# Patient Record
Sex: FEMALE | Race: WHITE | NOT HISPANIC OR LATINO | Employment: FULL TIME | URBAN - METROPOLITAN AREA
[De-identification: names, ages, dates, MRNs, and addresses within clinical notes are randomized per-mention and may not be internally consistent; named-entity substitution may affect disease eponyms.]

---

## 2021-03-09 ENCOUNTER — IMMUNIZATION (OUTPATIENT)
Dept: NURSING | Facility: CLINIC | Age: 31
End: 2021-03-09
Payer: COMMERCIAL

## 2021-03-09 PROCEDURE — 0031A PR COVID VAC JANSSEN AD26 0.5ML: CPT

## 2021-03-09 PROCEDURE — 91303 PR COVID VAC JANSSEN AD26 0.5ML: CPT

## 2021-04-03 ENCOUNTER — HEALTH MAINTENANCE LETTER (OUTPATIENT)
Age: 31
End: 2021-04-03

## 2021-05-26 ENCOUNTER — RECORDS - HEALTHEAST (OUTPATIENT)
Dept: ADMINISTRATIVE | Facility: CLINIC | Age: 31
End: 2021-05-26

## 2021-05-28 ENCOUNTER — RECORDS - HEALTHEAST (OUTPATIENT)
Dept: ADMINISTRATIVE | Facility: CLINIC | Age: 31
End: 2021-05-28

## 2021-06-02 ENCOUNTER — RECORDS - HEALTHEAST (OUTPATIENT)
Dept: ADMINISTRATIVE | Facility: CLINIC | Age: 31
End: 2021-06-02

## 2021-09-12 ENCOUNTER — HEALTH MAINTENANCE LETTER (OUTPATIENT)
Age: 31
End: 2021-09-12

## 2022-04-24 ENCOUNTER — HEALTH MAINTENANCE LETTER (OUTPATIENT)
Age: 32
End: 2022-04-24

## 2022-11-19 ENCOUNTER — HEALTH MAINTENANCE LETTER (OUTPATIENT)
Age: 32
End: 2022-11-19

## 2023-02-08 ENCOUNTER — VIRTUAL VISIT (OUTPATIENT)
Dept: FAMILY MEDICINE | Facility: CLINIC | Age: 33
End: 2023-02-08
Payer: COMMERCIAL

## 2023-02-08 DIAGNOSIS — I10 PRIMARY HYPERTENSION: Primary | ICD-10-CM

## 2023-02-08 DIAGNOSIS — F90.0 ATTENTION DEFICIT HYPERACTIVITY DISORDER (ADHD), PREDOMINANTLY INATTENTIVE TYPE: ICD-10-CM

## 2023-02-08 DIAGNOSIS — K21.9 GASTROESOPHAGEAL REFLUX DISEASE WITHOUT ESOPHAGITIS: ICD-10-CM

## 2023-02-08 DIAGNOSIS — E28.2 PCOS (POLYCYSTIC OVARIAN SYNDROME): ICD-10-CM

## 2023-02-08 DIAGNOSIS — F41.0 PANIC ATTACK: ICD-10-CM

## 2023-02-08 PROBLEM — F41.8 SITUATIONAL ANXIETY: Status: ACTIVE | Noted: 2019-09-26

## 2023-02-08 PROCEDURE — 99204 OFFICE O/P NEW MOD 45 MIN: CPT | Mod: TEL

## 2023-02-08 RX ORDER — PROPRANOLOL HYDROCHLORIDE 10 MG/1
10 TABLET ORAL PRN
COMMUNITY
Start: 2022-03-25 | End: 2023-02-08

## 2023-02-08 RX ORDER — DEXTROAMPHETAMINE SACCHARATE, AMPHETAMINE ASPARTATE, DEXTROAMPHETAMINE SULFATE AND AMPHETAMINE SULFATE 2.5; 2.5; 2.5; 2.5 MG/1; MG/1; MG/1; MG/1
10 TABLET ORAL 2 TIMES DAILY
COMMUNITY
Start: 2021-06-19

## 2023-02-08 RX ORDER — LISINOPRIL 20 MG/1
20 TABLET ORAL DAILY
Qty: 90 TABLET | Refills: 1 | Status: SHIPPED | OUTPATIENT
Start: 2023-02-08 | End: 2023-08-09

## 2023-02-08 RX ORDER — ACETAMINOPHEN AND CODEINE PHOSPHATE 120; 12 MG/5ML; MG/5ML
1 SOLUTION ORAL DAILY
COMMUNITY
Start: 2022-04-28 | End: 2023-11-24

## 2023-02-08 RX ORDER — PROPRANOLOL HYDROCHLORIDE 10 MG/1
10 TABLET ORAL PRN
Qty: 10 TABLET | Refills: 1 | Status: SHIPPED | OUTPATIENT
Start: 2023-02-08 | End: 2023-12-06

## 2023-02-08 RX ORDER — LISINOPRIL 20 MG/1
20 TABLET ORAL DAILY
COMMUNITY
Start: 2023-01-25 | End: 2023-02-08

## 2023-02-08 NOTE — PROGRESS NOTES
Stephanie is a 32 year old who is being evaluated via a billable telephone visit.      What phone number would you like to be contacted at? [phone]  How would you like to obtain your AVS? Yennifer  Distant Location (provider location):  On-site    Assessment & Plan     Primary hypertension  Chronic. Well controlled per patient report. Filled for 6 months, would like in person follow up prior to next refills so we can get updated VS in clinic and labs.   - lisinopril (ZESTRIL) 20 MG tablet  Dispense: 90 tablet; Refill: 1    Panic attack  Chronic/occasional. Rarely needs prn med. Renewed Rx with 1 refill. Will refill if needed prior to next in person appt withing 6 months. Discussed that adderall can have side effect of palpitations/tachycardia, especially if dehydrated and advised adequate fluid intake.   - propranolol (INDERAL) 10 MG tablet  Dispense: 10 tablet; Refill: 1    PCOS (polycystic ovarian syndrome)  Chronic. Takes metformin and POP. Has OBGyn provider, if not in her insurance network I am happy to refill for her.     Gastroesophageal reflux disease without esophagitis  Chronic. Poorly controlled per patient report. No prior bleeding or EGD. We discussed trying to increase her PPI to 40 mg prior to breakfast OR taking 20 mg in AM and again before dinner. Sent Rx for 6 months. In person follow up prior to further refills.   - omeprazole (PRILOSEC) 20 MG DR capsule  Dispense: 90 capsule; Refill: 1    Attention deficit hyperactivity disorder (ADHD), predominantly inattentive type  Chronic. Takes adderall 10 mg BID. Rx filled by mental health provider.       Ordering of each unique test  Prescription drug management         Nicotine/Tobacco Cessation:  She reports that she has been smoking cigarettes. She has never used smokeless tobacco.  Nicotine/Tobacco Cessation Plan:   Not discussed today      Follow up in person within 6 months - discuss GERD, BP control, anxiety/panic attacks and palpitations. Sooner  if worsening.     No follow-ups on file.    FIDELIA Gong CNP  [facility]    Hugh Brown is a 32 year old, presenting for the following health issues:  Hypertension      HPI     Changed insurance, needs new PCP, last seen by PCP in June 2022.    Currently prescriptions listed below from last pcp, sees gynecologist and psychiatrist.   lisinoril 20 mg bp, needs refills, run out tomorrow.   Omeprazole for gerd  Metformin 500 mg for pcos    Takes progesterone only birth control, prescribed by gynecologist.      Adderall 10 mg twice daily, prescribed by psychiatrist.     Hypertension Follow-up (lisinopril)    Do you check your blood pressure regularly outside of the clinic? Yes     Are you following a low salt diet? Yes    Are your blood pressures ever more than 140 on the top number (systolic) OR more   than 90 on the bottom number (diastolic), for example 140/90? No   When pt checks Bp at home, readings range between 120s-127/80-90s.   No identified secondary cause of HTN in the past, started with increased stress level in the past.     PCOS (metformin, norethindone POP)  Managed by her gynecologist, she has not checked if her health insurance will cover her OBGYN, might need these meds refilled in the future too.     Anxiety/Panic attacks (propranolol)  Managed by mental health provider.   - propranolol if needed, usually takes 1/2 to 1/4th of a tablet if she is noticing palpitations/anxiety.   - notices palpitations especially when laying down, when tired    GERD (omeprazole)  Takes PPI daily with breakfast, 20 mg, has not tried higher dose  Feels her GERD is not well controlled  Still has indigestion with PPI  Notices feeling sensation as if something stuck in throat                Review of Systems   Constitutional, HEENT, cardiovascular, pulmonary, gi and gu systems are negative, except as otherwise noted.      Objective           Vitals:  No vitals were obtained today due  to virtual visit.    Physical Exam   healthy, alert and no distress  PSYCH: Alert and oriented times 3; coherent speech, normal   rate and volume, able to articulate logical thoughts, able   to abstract reason, no tangential thoughts, no hallucinations   or delusions  Her affect is normal  RESP: No cough, no audible wheezing, able to talk in full sentences  Remainder of exam unable to be completed due to telephone visits    No results found for any previous visit.               Phone call duration: 20 minutes

## 2023-04-21 ENCOUNTER — OFFICE VISIT (OUTPATIENT)
Dept: INTERNAL MEDICINE | Facility: CLINIC | Age: 33
End: 2023-04-21
Payer: COMMERCIAL

## 2023-04-21 ENCOUNTER — TELEPHONE (OUTPATIENT)
Dept: INTERNAL MEDICINE | Facility: CLINIC | Age: 33
End: 2023-04-21

## 2023-04-21 VITALS
HEART RATE: 90 BPM | OXYGEN SATURATION: 99 % | WEIGHT: 155.5 LBS | HEIGHT: 64 IN | DIASTOLIC BLOOD PRESSURE: 88 MMHG | RESPIRATION RATE: 14 BRPM | BODY MASS INDEX: 26.55 KG/M2 | SYSTOLIC BLOOD PRESSURE: 144 MMHG | TEMPERATURE: 99.3 F

## 2023-04-21 DIAGNOSIS — Z12.4 CERVICAL CANCER SCREENING: ICD-10-CM

## 2023-04-21 DIAGNOSIS — R79.89 ELEVATED LIVER FUNCTION TESTS: ICD-10-CM

## 2023-04-21 DIAGNOSIS — F41.1 GAD (GENERALIZED ANXIETY DISORDER): Primary | ICD-10-CM

## 2023-04-21 DIAGNOSIS — F90.0 ATTENTION DEFICIT HYPERACTIVITY DISORDER (ADHD), PREDOMINANTLY INATTENTIVE TYPE: ICD-10-CM

## 2023-04-21 DIAGNOSIS — I10 HYPERTENSION, UNSPECIFIED TYPE: ICD-10-CM

## 2023-04-21 LAB
ALBUMIN SERPL BCG-MCNC: 4.3 G/DL (ref 3.5–5.2)
ALBUMIN UR-MCNC: NEGATIVE MG/DL
ALP SERPL-CCNC: 56 U/L (ref 35–104)
ALT SERPL W P-5'-P-CCNC: 27 U/L (ref 10–35)
ANION GAP SERPL CALCULATED.3IONS-SCNC: 13 MMOL/L (ref 7–15)
APPEARANCE UR: CLEAR
AST SERPL W P-5'-P-CCNC: 26 U/L (ref 10–35)
BILIRUB SERPL-MCNC: 0.4 MG/DL
BILIRUB UR QL STRIP: NEGATIVE
BUN SERPL-MCNC: 6.8 MG/DL (ref 6–20)
CALCIUM SERPL-MCNC: 9.4 MG/DL (ref 8.6–10)
CHLORIDE SERPL-SCNC: 106 MMOL/L (ref 98–107)
CHOLEST SERPL-MCNC: 247 MG/DL
COLOR UR AUTO: YELLOW
CREAT SERPL-MCNC: 0.65 MG/DL (ref 0.51–0.95)
CREAT UR-MCNC: 213 MG/DL
DEPRECATED HCO3 PLAS-SCNC: 22 MMOL/L (ref 22–29)
ERYTHROCYTE [DISTWIDTH] IN BLOOD BY AUTOMATED COUNT: 13.8 % (ref 10–15)
GFR SERPL CREATININE-BSD FRML MDRD: >90 ML/MIN/1.73M2
GLUCOSE SERPL-MCNC: 107 MG/DL (ref 70–99)
GLUCOSE UR STRIP-MCNC: NEGATIVE MG/DL
HBA1C MFR BLD: 5.4 % (ref 0–5.6)
HCT VFR BLD AUTO: 43.5 % (ref 35–47)
HDLC SERPL-MCNC: 88 MG/DL
HGB BLD-MCNC: 14.2 G/DL (ref 11.7–15.7)
HGB UR QL STRIP: NEGATIVE
KETONES UR STRIP-MCNC: NEGATIVE MG/DL
LDLC SERPL CALC-MCNC: 135 MG/DL
LEUKOCYTE ESTERASE UR QL STRIP: NEGATIVE
MCH RBC QN AUTO: 28.2 PG (ref 26.5–33)
MCHC RBC AUTO-ENTMCNC: 32.6 G/DL (ref 31.5–36.5)
MCV RBC AUTO: 86 FL (ref 78–100)
MICROALBUMIN UR-MCNC: <12 MG/L
MICROALBUMIN/CREAT UR: NORMAL MG/G{CREAT}
NITRATE UR QL: NEGATIVE
NONHDLC SERPL-MCNC: 159 MG/DL
PH UR STRIP: 6 [PH] (ref 5–8)
PLATELET # BLD AUTO: 261 10E3/UL (ref 150–450)
POTASSIUM SERPL-SCNC: 3.8 MMOL/L (ref 3.4–5.3)
PROT SERPL-MCNC: 6.8 G/DL (ref 6.4–8.3)
RBC # BLD AUTO: 5.04 10E6/UL (ref 3.8–5.2)
SODIUM SERPL-SCNC: 141 MMOL/L (ref 136–145)
SP GR UR STRIP: >=1.03 (ref 1–1.03)
TRIGL SERPL-MCNC: 119 MG/DL
TSH SERPL DL<=0.005 MIU/L-ACNC: 0.57 UIU/ML (ref 0.3–4.2)
UROBILINOGEN UR STRIP-ACNC: 0.2 E.U./DL
WBC # BLD AUTO: 5.6 10E3/UL (ref 4–11)

## 2023-04-21 PROCEDURE — 84443 ASSAY THYROID STIM HORMONE: CPT | Performed by: INTERNAL MEDICINE

## 2023-04-21 PROCEDURE — 36415 COLL VENOUS BLD VENIPUNCTURE: CPT | Performed by: INTERNAL MEDICINE

## 2023-04-21 PROCEDURE — 87340 HEPATITIS B SURFACE AG IA: CPT | Performed by: INTERNAL MEDICINE

## 2023-04-21 PROCEDURE — 83036 HEMOGLOBIN GLYCOSYLATED A1C: CPT | Performed by: INTERNAL MEDICINE

## 2023-04-21 PROCEDURE — 85027 COMPLETE CBC AUTOMATED: CPT | Performed by: INTERNAL MEDICINE

## 2023-04-21 PROCEDURE — 80061 LIPID PANEL: CPT | Performed by: INTERNAL MEDICINE

## 2023-04-21 PROCEDURE — 80053 COMPREHEN METABOLIC PANEL: CPT | Performed by: INTERNAL MEDICINE

## 2023-04-21 PROCEDURE — 86803 HEPATITIS C AB TEST: CPT | Performed by: INTERNAL MEDICINE

## 2023-04-21 PROCEDURE — 81003 URINALYSIS AUTO W/O SCOPE: CPT | Performed by: INTERNAL MEDICINE

## 2023-04-21 PROCEDURE — 82570 ASSAY OF URINE CREATININE: CPT | Performed by: INTERNAL MEDICINE

## 2023-04-21 PROCEDURE — 96127 BRIEF EMOTIONAL/BEHAV ASSMT: CPT | Performed by: INTERNAL MEDICINE

## 2023-04-21 PROCEDURE — 82043 UR ALBUMIN QUANTITATIVE: CPT | Performed by: INTERNAL MEDICINE

## 2023-04-21 PROCEDURE — 99214 OFFICE O/P EST MOD 30 MIN: CPT | Performed by: INTERNAL MEDICINE

## 2023-04-21 RX ORDER — BUPROPION HYDROCHLORIDE 150 MG/1
150 TABLET ORAL EVERY MORNING
Qty: 90 TABLET | Refills: 3 | Status: SHIPPED | OUTPATIENT
Start: 2023-04-21 | End: 2023-11-24 | Stop reason: DRUGHIGH

## 2023-04-21 RX ORDER — LISINOPRIL 10 MG/1
1 TABLET ORAL
COMMUNITY
Start: 2022-04-27 | End: 2023-04-21

## 2023-04-21 ASSESSMENT — ANXIETY QUESTIONNAIRES
1. FEELING NERVOUS, ANXIOUS, OR ON EDGE: MORE THAN HALF THE DAYS
7. FEELING AFRAID AS IF SOMETHING AWFUL MIGHT HAPPEN: NEARLY EVERY DAY
GAD7 TOTAL SCORE: 12
GAD7 TOTAL SCORE: 12
3. WORRYING TOO MUCH ABOUT DIFFERENT THINGS: SEVERAL DAYS
IF YOU CHECKED OFF ANY PROBLEMS ON THIS QUESTIONNAIRE, HOW DIFFICULT HAVE THESE PROBLEMS MADE IT FOR YOU TO DO YOUR WORK, TAKE CARE OF THINGS AT HOME, OR GET ALONG WITH OTHER PEOPLE: SOMEWHAT DIFFICULT
2. NOT BEING ABLE TO STOP OR CONTROL WORRYING: SEVERAL DAYS
4. TROUBLE RELAXING: MORE THAN HALF THE DAYS
7. FEELING AFRAID AS IF SOMETHING AWFUL MIGHT HAPPEN: NEARLY EVERY DAY
GAD7 TOTAL SCORE: 12
6. BECOMING EASILY ANNOYED OR IRRITABLE: SEVERAL DAYS
8. IF YOU CHECKED OFF ANY PROBLEMS, HOW DIFFICULT HAVE THESE MADE IT FOR YOU TO DO YOUR WORK, TAKE CARE OF THINGS AT HOME, OR GET ALONG WITH OTHER PEOPLE?: SOMEWHAT DIFFICULT
5. BEING SO RESTLESS THAT IT IS HARD TO SIT STILL: MORE THAN HALF THE DAYS

## 2023-04-21 ASSESSMENT — ENCOUNTER SYMPTOMS: NERVOUS/ANXIOUS: 1

## 2023-04-21 ASSESSMENT — PAIN SCALES - GENERAL: PAINLEVEL: MILD PAIN (2)

## 2023-04-21 NOTE — PROGRESS NOTES
"  Assessment & Plan     Cervical cancer screening  She says that she has had it at another facility.    MASTER (generalized anxiety disorder)  She has been on antidepressants in the past and propanolol.  She also admits the Adderall might be perpetuating the anxiety and so is the nicotine nurse smoking.    Hypertension, unspecified type  Was started by her primary care physician I do not see any secondary hypertension work-up.  We will for now continue the medication strongly advised nicotine cessation.  Switch to Wellbutrin for anxiety and the attention deficit and taper off Adderall.  We will do labs today and close follow-up.  - Comprehensive metabolic panel  - TSH  - CBC with platelets  - buPROPion (WELLBUTRIN XL) 150 MG 24 hr tablet  Dispense: 90 tablet; Refill: 3  - nicotine (NICOTROL) 10 MG inhaler  Dispense: 90 each; Refill: 3  - Lipid panel reflex to direct LDL Fasting  - Hemoglobin A1c  - UA Macro with Reflex to Micro and Culture - lab collect  - Albumin Random Urine Quantitative with Creat Ratio  - Comprehensive metabolic panel  - TSH  - CBC with platelets  - Lipid panel reflex to direct LDL Fasting  - Hemoglobin A1c  - UA Macro with Reflex to Micro and Culture - lab collect  - Albumin Random Urine Quantitative with Creat Ratio    Attention deficit hyperactivity disorder (ADHD), predominantly inattentive type      Elevated liver function tests    - Hepatitis C antibody  - Hepatitis B surface antigen  - Hepatitis C antibody  - Hepatitis B surface antigen               BMI:   Estimated body mass index is 26.91 kg/m  as calculated from the following:    Height as of this encounter: 1.619 m (5' 3.74\").    Weight as of this encounter: 70.5 kg (155 lb 8 oz).           Marleny Leal MD  [facility]   Stephanie is a 32 year old, presenting for the following health issues: Very pleasant patient  Recheck Medication, Anxiety (Anxiety and panic attacks. Pt reports HTN has been " "triggering panic attacks. Pt reports on Tuesday she had chest tightness that resolved 3 hours later.  Wednesday she states she felt like she was going to pass out. Pt reports having a EKG done last year. ), Derm Problem (Pt states she has a \"skin tag or wart\" on inner upper thigh (r) that she would like examined ), and Hypertension (Concerns with bp)  high bp when she went for her physical and started on lisinopril this happened last year also diagnosed to have ADTwo years ago   Grad school D started adderral 10mg twice a day she noticed her higher blood pressure did try to lower 5mg twice adya     Both parents have high blood pressure diagnoses , early IHD , 55   cigareete smoking   Has tried nicotine patch   Alcohol , 3-4 drinks hard liquor she drinks this every day      4/21/2023     9:33 AM   Additional Questions   Roomed by Miranda Mckeon    History of Present Illness       Mental Health Follow-up:  Patient presents to follow-up on Anxiety.    Patient's anxiety since last visit has been:  Medium  The patient is having other symptoms associated with anxiety.  Any significant life events: health concerns  Patient is feeling anxious or having panic attacks.  Patient has no concerns about alcohol or drug use.    Hypertension: She presents for follow up of hypertension.  She does check blood pressure  regularly outside of the clinic. Outpatient blood pressures have not been over 140/90. She does not follow a low salt diet.     She eats 2-3 servings of fruits and vegetables daily.She consumes 1 sweetened beverage(s) daily.She exercises with enough effort to increase her heart rate 60 or more minutes per day.  She exercises with enough effort to increase her heart rate 5 days per week.   She is taking medications regularly.  Today's MASTER-7 Score: 12       Patient Active Problem List   Diagnosis     Panic attack     Situational anxiety     PCOS (polycystic ovarian syndrome)     GERD (gastroesophageal reflux " "disease)     Attention deficit hyperactivity disorder (ADHD), predominantly inattentive type             Review of Systems   Psychiatric/Behavioral: The patient is nervous/anxious.             Objective    BP (!) 144/88 (BP Location: Left arm, Patient Position: Sitting, Cuff Size: Adult Regular)   Pulse 90   Temp 99.3  F (37.4  C) (Tympanic)   Resp 14   Ht 1.619 m (5' 3.74\")   Wt 70.5 kg (155 lb 8 oz)   LMP 05/01/2022 (Approximate)   SpO2 99%   BMI 26.91 kg/m    Body mass index is 26.91 kg/m .  Physical Exam   GENERAL: healthy, alert and no distress  NECK: no adenopathy, no asymmetry, masses, or scars and thyroid normal to palpation  RESP: lungs clear to auscultation - no rales, rhonchi or wheezes  CV: regular rate and rhythm, normal S1 S2, no S3 or S4, no murmur, click or rub, no peripheral edema and peripheral pulses strong  ABDOMEN: soft, nontender, no hepatosplenomegaly, no masses and bowel sounds normal  MS: no gross musculoskeletal defects noted, no edema                    "

## 2023-04-21 NOTE — TELEPHONE ENCOUNTER
PRIOR AUTHORIZATION DENIED    Medication: nicotine (NICOTROL) 10 MG inhaler - EPA DENIED    Denial Date: 4/21/2023    Denial Rational:       Appeal Information:

## 2023-04-21 NOTE — PATIENT INSTRUCTIONS
[employer] is for any [facility] looking for free support to quit smoking, vaping or chewing.   [employer] will offer many quit support options and resources so [place] residents can continue to find the way to quit that works best for them.   Free support includes personalized coaching, email and text support, educational materials, and quit medication (nicotine patches, gum or lozenges) delivered by mail.     Contact [employer] at 1-800-QUIT-NOW or online at [employer] to receive support on your quit journey.

## 2023-04-24 LAB
HBV SURFACE AG SERPL QL IA: NONREACTIVE
HCV AB SERPL QL IA: NONREACTIVE

## 2023-05-05 ENCOUNTER — OFFICE VISIT (OUTPATIENT)
Dept: INTERNAL MEDICINE | Facility: CLINIC | Age: 33
End: 2023-05-05
Payer: COMMERCIAL

## 2023-05-05 VITALS
HEART RATE: 88 BPM | DIASTOLIC BLOOD PRESSURE: 88 MMHG | SYSTOLIC BLOOD PRESSURE: 132 MMHG | TEMPERATURE: 98.8 F | RESPIRATION RATE: 16 BRPM | HEIGHT: 63 IN | BODY MASS INDEX: 27.05 KG/M2 | OXYGEN SATURATION: 99 % | WEIGHT: 152.7 LBS

## 2023-05-05 DIAGNOSIS — F90.0 ATTENTION DEFICIT HYPERACTIVITY DISORDER (ADHD), PREDOMINANTLY INATTENTIVE TYPE: Primary | ICD-10-CM

## 2023-05-05 DIAGNOSIS — F41.1 GAD (GENERALIZED ANXIETY DISORDER): ICD-10-CM

## 2023-05-05 DIAGNOSIS — F17.201 NICOTINE DEPENDENCE IN REMISSION, UNSPECIFIED NICOTINE PRODUCT TYPE: ICD-10-CM

## 2023-05-05 DIAGNOSIS — B08.1 MOLLUSCUM CONTAGIOSUM INFECTION: ICD-10-CM

## 2023-05-05 DIAGNOSIS — I10 ESSENTIAL HYPERTENSION: ICD-10-CM

## 2023-05-05 PROCEDURE — 99213 OFFICE O/P EST LOW 20 MIN: CPT | Performed by: INTERNAL MEDICINE

## 2023-05-05 RX ORDER — MUPIROCIN 20 MG/G
OINTMENT TOPICAL 3 TIMES DAILY
Qty: 22 G | Refills: 1 | Status: SHIPPED | OUTPATIENT
Start: 2023-05-05 | End: 2023-11-24

## 2023-05-05 ASSESSMENT — ANXIETY QUESTIONNAIRES
7. FEELING AFRAID AS IF SOMETHING AWFUL MIGHT HAPPEN: SEVERAL DAYS
4. TROUBLE RELAXING: NOT AT ALL
GAD7 TOTAL SCORE: 4
6. BECOMING EASILY ANNOYED OR IRRITABLE: SEVERAL DAYS
5. BEING SO RESTLESS THAT IT IS HARD TO SIT STILL: NOT AT ALL
8. IF YOU CHECKED OFF ANY PROBLEMS, HOW DIFFICULT HAVE THESE MADE IT FOR YOU TO DO YOUR WORK, TAKE CARE OF THINGS AT HOME, OR GET ALONG WITH OTHER PEOPLE?: SOMEWHAT DIFFICULT
IF YOU CHECKED OFF ANY PROBLEMS ON THIS QUESTIONNAIRE, HOW DIFFICULT HAVE THESE PROBLEMS MADE IT FOR YOU TO DO YOUR WORK, TAKE CARE OF THINGS AT HOME, OR GET ALONG WITH OTHER PEOPLE: SOMEWHAT DIFFICULT
2. NOT BEING ABLE TO STOP OR CONTROL WORRYING: NOT AT ALL
GAD7 TOTAL SCORE: 4
GAD7 TOTAL SCORE: 4
3. WORRYING TOO MUCH ABOUT DIFFERENT THINGS: SEVERAL DAYS
7. FEELING AFRAID AS IF SOMETHING AWFUL MIGHT HAPPEN: SEVERAL DAYS
1. FEELING NERVOUS, ANXIOUS, OR ON EDGE: SEVERAL DAYS

## 2023-05-19 NOTE — TELEPHONE ENCOUNTER
She is currently on bupriopin and has been for many years and the nicotol is for tobacco cessation so meets their criteria . Should I write a letter or write it in the office note ?

## 2023-05-23 NOTE — TELEPHONE ENCOUNTER
Prior Authorization Approval    Medication:    Authorization Effective Date: 5/20/2023  Authorization Expiration Date: 5/20/2024  Approved Dose/Quantity:  Reference #:     Insurance Company: "[employer] ([facility]) - Phone [phone] Fax [phone]  Expected CoPay:       CoPay Card Available:      Financial Assistance Needed:   Which Pharmacy is filling the prescription: [facility] [place] [place] [id number] - [place], [place] - [address] [place] [address] [address]  Pharmacy Notified: Yes  Patient Notified: Yes

## 2023-06-01 ENCOUNTER — HEALTH MAINTENANCE LETTER (OUTPATIENT)
Age: 33
End: 2023-06-01

## 2023-06-03 ENCOUNTER — MYC MEDICAL ADVICE (OUTPATIENT)
Dept: INTERNAL MEDICINE | Facility: CLINIC | Age: 33
End: 2023-06-03
Payer: COMMERCIAL

## 2023-06-03 DIAGNOSIS — F41.0 PANIC ATTACK: Primary | ICD-10-CM

## 2023-06-12 RX ORDER — BUPROPION HYDROCHLORIDE 300 MG/1
300 TABLET ORAL EVERY MORNING
Qty: 90 TABLET | Refills: 3 | Status: SHIPPED | OUTPATIENT
Start: 2023-06-12

## 2023-08-09 DIAGNOSIS — I10 PRIMARY HYPERTENSION: ICD-10-CM

## 2023-08-09 DIAGNOSIS — K21.9 GASTROESOPHAGEAL REFLUX DISEASE WITHOUT ESOPHAGITIS: ICD-10-CM

## 2023-08-09 RX ORDER — LISINOPRIL 20 MG/1
20 TABLET ORAL DAILY
Qty: 30 TABLET | Refills: 11 | Status: SHIPPED | OUTPATIENT
Start: 2023-08-09 | End: 2024-08-19

## 2023-09-29 ENCOUNTER — OFFICE VISIT (OUTPATIENT)
Dept: URGENT CARE | Facility: URGENT CARE | Age: 33
End: 2023-09-29
Payer: COMMERCIAL

## 2023-09-29 ENCOUNTER — ANCILLARY PROCEDURE (OUTPATIENT)
Dept: GENERAL RADIOLOGY | Facility: CLINIC | Age: 33
End: 2023-09-29
Attending: EMERGENCY MEDICINE
Payer: COMMERCIAL

## 2023-09-29 VITALS
HEART RATE: 95 BPM | DIASTOLIC BLOOD PRESSURE: 82 MMHG | TEMPERATURE: 97.2 F | BODY MASS INDEX: 27.46 KG/M2 | OXYGEN SATURATION: 96 % | WEIGHT: 155 LBS | SYSTOLIC BLOOD PRESSURE: 124 MMHG | RESPIRATION RATE: 18 BRPM

## 2023-09-29 DIAGNOSIS — J20.9 ACUTE BRONCHITIS WITH SYMPTOMS > 10 DAYS: ICD-10-CM

## 2023-09-29 DIAGNOSIS — J20.9 ACUTE BRONCHITIS WITH SYMPTOMS > 10 DAYS: Primary | ICD-10-CM

## 2023-09-29 DIAGNOSIS — R06.2 WHEEZING: ICD-10-CM

## 2023-09-29 PROCEDURE — 99214 OFFICE O/P EST MOD 30 MIN: CPT | Performed by: EMERGENCY MEDICINE

## 2023-09-29 PROCEDURE — 71046 X-RAY EXAM CHEST 2 VIEWS: CPT | Mod: TC | Performed by: RADIOLOGY

## 2023-09-29 RX ORDER — PREDNISONE 20 MG/1
TABLET ORAL
Qty: 4 TABLET | Refills: 0 | Status: SHIPPED | OUTPATIENT
Start: 2023-09-29 | End: 2023-10-04

## 2023-09-29 RX ORDER — BENZONATATE 100 MG/1
100 CAPSULE ORAL
COMMUNITY
Start: 2023-09-24 | End: 2023-09-29

## 2023-09-29 RX ORDER — PREDNISONE 10 MG/1
10 TABLET ORAL
COMMUNITY
Start: 2023-09-24 | End: 2023-09-29

## 2023-09-29 RX ORDER — AZITHROMYCIN 250 MG/1
TABLET, FILM COATED ORAL
COMMUNITY
Start: 2023-09-24 | End: 2023-09-29

## 2023-09-29 NOTE — PROGRESS NOTES
Assessment & Plan     Diagnosis:    ICD-10-CM    1. Acute bronchitis with symptoms > 10 days  J20.9 XR Chest 2 Views     predniSONE (DELTASONE) 20 MG tablet      2. Wheezing  R06.2         Medical Decision Making:  Stephanie Arevalo is a 33 year old female who presents for evaluation of continued cough for 3 weeks, intermittent wheezing.  This is consistent with an upper respiratory tract infection.  There is no signs at this point of serious bacterial infection such as OM, RPA, epiglottitis, PTA, strep pharyngitis, pneumonia, meningitis, bacteremia, serious bacterial infection.      Given duration of symptoms , I did a CXR to eval for pneumonia. This shows no acute infiltrate. Suspect bronchitis; likely viral. Will taper prednisone therapy given wheezing on exam. Patient is PERC negative; no indication for PE work up. There are no signs of complications of pneumonia at this point such as septic shock, bacteremia, empyema, hypoxia, respiratory failure or compromise.     There are no gastrointestinal symptoms at this point and no signs of dehydration.  Close followup with PCP is indicated.  Go to ED for fever > 102 F, protracted vomiting, worsening shortness of breath, chest pain or other concerns.  Patient verbalized understanding and agreement with the plan was discharged in stable condition.      Joselito Quinones PA-C  [facility]    Subjective     Stephanie Arevalo is a 33 year old female who presents to clinic today for the following health issues:  Chief Complaint   Patient presents with    Urgent Care    Cough     Pt was treated for bronchitis x1 week, not improving.        HPI  Patient reports cough for 3 weeks, was treated a week ago with azithromycin and prednisone and does not feel that this is very helpful.  She has not tried any of the cough medication she was prescribed.  She states that she was wheezing last night and into this morning, feeling slightly short of breath with this.   She denies any leg swelling or calf pain, difficulties breathing at rest or with minimal exertion, history of DVT/PE, sore throat, fevers, body aches, lightheadedness, recent surgeries or long periods immobilization.    Review of Systems    See HPI    Objective      Vitals: /82   Pulse 95   Temp 97.2  F (36.2  C) (Temporal)   Resp 18   Wt 70.3 kg (155 lb)   SpO2 96%   BMI 27.46 kg/m      Patient Vitals for the past 24 hrs:   BP Temp Temp src Pulse Resp SpO2 Weight   09/29/23 1455 -- -- -- 95 -- 96 % --   09/29/23 1335 124/82 97.2  F (36.2  C) Temporal 108 18 95 % 70.3 kg (155 lb)       Vital signs reviewed by: Joselito Quinones PA-C    Physical Exam   Constitutional: Patient is alert and cooperative. No acute distress.  Ears: Right TM is normal. Left TM is normal. External ear canals are normal.  Mouth: Mucous membranes are moist. Normal tongue and tonsil. Posterior oropharynx is clear.  Cardiovascular: Regular rate and rhythm  Pulmonary/Chest: Wheezes in the bilateral upper lung fields.  No rales or rhonchi.  Speaking full sentences, no respiratory distress.  No stridor or retractions.  GI: Abdomen is soft and non-tender throughout. No CVA tenderness bilaterally.  Neurological: Alert and oriented x3.   Skin: No rash noted on visualized skin.  Psychiatric:The patient has a normal mood and affect.     Labs/Imaging:  Results for orders placed or performed in visit on 09/29/23   XR Chest 2 Views     Status: None (Preliminary result)    Narrative    CHEST TWO VIEWS  September 29, 2023 2:28 PM     HISTORY: Acute bronchitis with symptoms > 10 days.    COMPARISON: None.      Impression    IMPRESSION: No acute cardiopulmonary disease.     Reading per radiology      Joselito Quinones PA-C, September 29, 2023

## 2023-11-08 ENCOUNTER — OFFICE VISIT (OUTPATIENT)
Dept: URGENT CARE | Facility: URGENT CARE | Age: 33
End: 2023-11-08
Payer: COMMERCIAL

## 2023-11-08 VITALS
WEIGHT: 150 LBS | RESPIRATION RATE: 18 BRPM | OXYGEN SATURATION: 99 % | DIASTOLIC BLOOD PRESSURE: 80 MMHG | TEMPERATURE: 98 F | BODY MASS INDEX: 26.57 KG/M2 | SYSTOLIC BLOOD PRESSURE: 124 MMHG | HEART RATE: 78 BPM

## 2023-11-08 DIAGNOSIS — T36.95XA ANTIBIOTIC-INDUCED YEAST INFECTION: ICD-10-CM

## 2023-11-08 DIAGNOSIS — R05.1 ACUTE COUGH: Primary | ICD-10-CM

## 2023-11-08 DIAGNOSIS — H65.92 OME (OTITIS MEDIA WITH EFFUSION), LEFT: ICD-10-CM

## 2023-11-08 DIAGNOSIS — B37.9 ANTIBIOTIC-INDUCED YEAST INFECTION: ICD-10-CM

## 2023-11-08 PROCEDURE — 87635 SARS-COV-2 COVID-19 AMP PRB: CPT | Performed by: NURSE PRACTITIONER

## 2023-11-08 PROCEDURE — 99213 OFFICE O/P EST LOW 20 MIN: CPT | Performed by: NURSE PRACTITIONER

## 2023-11-08 RX ORDER — FLUCONAZOLE 150 MG/1
150 TABLET ORAL
Qty: 2 TABLET | Refills: 0 | Status: SHIPPED | OUTPATIENT
Start: 2023-11-09 | End: 2023-11-14

## 2023-11-08 RX ORDER — AMOXICILLIN 875 MG
875 TABLET ORAL 2 TIMES DAILY
Qty: 14 TABLET | Refills: 0 | Status: SHIPPED | OUTPATIENT
Start: 2023-11-08 | End: 2023-11-15

## 2023-11-08 NOTE — PROGRESS NOTES
Chief Complaint   Patient presents with    Urgent Care    Cough     Pt wants RSV test. Dry cough. Sick last Tuesday, cough yesterday.    Otitis Media     SUBJECTIVE:  Stephanie Arevalo is a 33 year old female presenting with sore throat sinus congestion earache over the last week, worsening cough in the last day.  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  She currently is smoking.  Declines chest pain severe shortness of breath bloody sputum.  Had negative COVID testing.    *I explained to the patient that there are no high risk indicators for RSV such as pregnancy or exposures to  babies.  We could still order the test if she wishes.  Of note patient became very upset with the MA when she was made aware that the RSV test cannot be done rapidly for adults, it is a send out and part of a respiratory panel that could be costly.  She became verbally abusive to the MA regarding a wasted urgent care visit, spent 2 hours for nothing (she waited 1h 15min to be seen), now she has to pay for the visit.    No past medical history on file.  amphetamine-dextroamphetamine (ADDERALL) 10 MG tablet, Take 10 mg by mouth 2 times daily  buPROPion (WELLBUTRIN XL) 150 MG 24 hr tablet, Take 1 tablet (150 mg) by mouth every morning  buPROPion (WELLBUTRIN XL) 300 MG 24 hr tablet, Take 1 tablet (300 mg) by mouth every morning  lisinopril (ZESTRIL) 20 MG tablet, TAKE 1 TABLET BY MOUTH DAILY  metFORMIN (GLUCOPHAGE) 500 MG tablet, Take 1 tablet (500 mg) by mouth daily (with breakfast)  norethindrone (MICRONOR) 0.35 MG tablet, Take 1 tablet by mouth daily  omeprazole (PRILOSEC) 20 MG DR capsule, TAKE 1 CAPSULE BY MOUTH DAILY  mupirocin (BACTROBAN) 2 % external ointment, Apply topically 3 times daily  nicotine (NICOTROL) 10 MG inhaler, Use 1 cartridge as needed for urge to smoke by puffing over course of 20min.  Use 6-16 cart/day; reduce number of cart/day over 6-12 weeks. (Patient not taking:  Reported on 5/5/2023)  propranolol (INDERAL) 10 MG tablet, Take 1 tablet (10 mg) by mouth as needed (anxiety)    No current facility-administered medications on file prior to visit.    Social History     Tobacco Use    Smoking status: Every Day     Packs/day: .25     Types: Cigarettes     Start date: 7/1/2008    Smokeless tobacco: Never   Substance Use Topics    Alcohol use: Not on file     Allergies   Allergen Reactions    Codeine GI Disturbance and Nausea and Vomiting    Sulfa Antibiotics        Review of Systems  All systems negative except for those listed above in HPI.    OBJECTIVE:   /80   Pulse 78   Temp 98  F (36.7  C) (Temporal)   Resp 18   Wt 68 kg (150 lb)   SpO2 99%   BMI 26.57 kg/m      Physical Exam  Vitals reviewed.   Constitutional:       General: She is in acute distress.      Appearance: Normal appearance. She is well-developed. She is ill-appearing. She is not toxic-appearing or diaphoretic.   HENT:      Head: Normocephalic and atraumatic.      Ears:      Comments: Left TM is erythematous and bulging.     Nose: Congestion and rhinorrhea present.      Mouth/Throat:      Pharynx: No oropharyngeal exudate or posterior oropharyngeal erythema.   Eyes:      Conjunctiva/sclera: Conjunctivae normal.      Pupils: Pupils are equal, round, and reactive to light.   Cardiovascular:      Rate and Rhythm: Normal rate.      Pulses: Normal pulses.   Pulmonary:      Effort: Respiratory distress (cough) present.      Breath sounds: No stridor. No wheezing, rhonchi or rales.   Chest:      Chest wall: No tenderness.   Musculoskeletal:         General: Normal range of motion.      Cervical back: Normal range of motion and neck supple.   Lymphadenopathy:      Cervical: Cervical adenopathy present.   Skin:     General: Skin is warm.      Capillary Refill: Capillary refill takes less than 2 seconds.      Findings: No rash.   Neurological:      General: No focal deficit present.      Mental Status: She is alert  and oriented to person, place, and time.   Psychiatric:         Mood and Affect: Mood normal.         Behavior: Behavior normal.       ASSESSMENT:    ICD-10-CM    1. Acute cough  R05.1 Symptomatic COVID-19 Virus (Coronavirus) by PCR     Symptomatic COVID-19 Virus (Coronavirus) by PCR Nose     CANCELED: RSV rapid antigen     CANCELED: Respiratory Syncytial Virus (RSV) Antigen      2. Antibiotic-induced yeast infection  B37.9 fluconazole (DIFLUCAN) 150 MG tablet    T36.95XA       3. OME (otitis media with effusion), left  H65.92 amoxicillin (AMOXIL) 875 MG tablet        PLAN:     Amoxicillin for left ear infection  Diflucan for antibiotic induced vaginal yeast infection per patient request  Strep negative and COVID pending  Patient quite upset with MA regarding inability to obtain rapid RSV testing despite no indication for this  Rest! Your body needs more rest to heal.  Drink plenty of fluids (warm fluids like tea or soup are soothing and reduce cough)  Sit in the bathroom with a hot shower running and breathe in the steam.  Honey may soothe your sore throat and help manage your cough- may take straight or in warm water with lemon juice.  Avoid smoke (cigarettes, bonfires, fireplace, wood burning stoves).  Take Tylenol or an NSAID such as ibuprofen or naproxen as needed for pain.  Delsym (dextromethorphan polistirex) is an over the counter cough medication that lasts 12 hours.   Mucinex or Robitussin (guiafenesin) thin mucus and may help it to loosen more quickly  Good handwashing is the best way to prevent spread of germs  Present to emergency room if you develop trouble breathing, swallowing or cough-up blood, chest pain.  Follow up with your primary care provider if symptoms worsen or fail to improve as expected.    Follow up with primary care provider with any problems, questions or concerns or if symptoms worsen or fail to improve. Patient agreed to plan and verbalized understanding.    ROB Short-NORRIS MAYERS  [facility] [place]

## 2023-11-09 LAB — SARS-COV-2 RNA RESP QL NAA+PROBE: NEGATIVE

## 2023-11-24 ENCOUNTER — VIRTUAL VISIT (OUTPATIENT)
Dept: FAMILY MEDICINE | Facility: CLINIC | Age: 33
End: 2023-11-24
Payer: COMMERCIAL

## 2023-11-24 DIAGNOSIS — J20.9 ACUTE BRONCHITIS WITH SYMPTOMS > 10 DAYS: Primary | ICD-10-CM

## 2023-11-24 PROCEDURE — 99214 OFFICE O/P EST MOD 30 MIN: CPT | Mod: VID

## 2023-11-24 RX ORDER — BENZONATATE 100 MG/1
100 CAPSULE ORAL 3 TIMES DAILY PRN
Qty: 30 CAPSULE | Refills: 0 | Status: CANCELLED | OUTPATIENT
Start: 2023-11-24 | End: 2023-12-04

## 2023-11-24 RX ORDER — ALBUTEROL SULFATE 90 UG/1
2 AEROSOL, METERED RESPIRATORY (INHALATION) EVERY 6 HOURS PRN
Qty: 18 G | Refills: 0 | Status: CANCELLED | OUTPATIENT
Start: 2023-11-24

## 2023-11-24 NOTE — PROGRESS NOTES
Stephanie is a 33 year old who is being evaluated via a billable video visit.      How would you like to obtain your AVS? MyChart  If the video visit is dropped, the invitation should be resent by: Other e-mail: CELL  Will anyone else be joining your video visit? No          Assessment & Plan     (J20.9) Acute bronchitis with symptoms > 10 days  (primary encounter diagnosis)  Comment: Unchanged.  No alarm signs at this time given patient is afebrile, does not complain of chest pain or chest congestion, and does not describe a more biphasic type of illness course.  It is much more likely that her illness is caused by a viral infection with a persistent lingering cough.  However, given the patient's longstanding course of illness over the past 2 months, it is justifiable to repeat a chest x-ray to ensure that there is not a more malicious cause being missed.  Cussed with patient that if the chest x-ray is clear, we can be more confident that her illness is much more likely related to a viral infection, and we can treat it as such.  If symptoms do not resolve within the next 1 to 2 weeks, patient should follow-up with primary care provider with the likely need for escalation in treatment plan with potential need for antibiotic treatment  Plan: XR Chest 2 Views        - a nasal saline spray and begin using it 1-2 times a day to encourage sinus drainage that may be contributing to congestion and cough  -Well and ibuprofen around-the-clock to manage low-grade fever and encourage comfort  -Increase p.o. liquid intake to support hydration  -Continue the use of albuterol to manage cough as needed  -Can try the Tessalon Perles beginning with half a pill in the evening to determine how your body will respond to it.  If no drowsiness is present, you can feel free to take these during the day to manage cough as well  -Continue to work towards complete smoking cessation through the use of Wellbutrin    I spent a total of 33  minutes on the day of the visit.   Time spent by me doing chart review, history and exam, documentation and further activities per the note        FUTURE APPOINTMENTS:       - Follow-up visit in 1 week if symptoms worsen or do not improve  -Schedule an appointment to obtain a chest x-ray       - Follow-up for annual visit or as needed  Patient Instructions   We will repeat a chest x-ray to rule out pneumonia considering how long you have had this cough and illness symptoms.  If the x-ray shows that there is an infection in your lungs, I will send in antibiotic prescription to your pharmacy.  If this is the case, I would like you to take the entire course of the medication regardless of symptom improvement.  Sometimes these medications can cause stomach upset, so taking it with food can help to prevent this.    If the x-ray shows no infection in your lungs, it is much more likely that your illness is related to a virus. Even though an antibiotic will not help to treat your infection, there are things that we can do to relieve some of your symptoms.    Albuterol: Continue using your inhaler as needed for bouts of cough or shortness of breath.    Tessalon Perles: Try taking half a tablet of Tessalon Perles in the evening to determine how your body will respond to it.  Of course there is a likelihood that you will feel a bit drowsy when taking this medication, and if that is the case reserve this medication for nighttime use only.  If you do not find yourself feeling drowsy, it is safe to take this medication during the day to manage cough    Saline spray: I would like you to  with saline spray over-the-counter.  This works best when you lean slightly forward, insert the spray nozzle into your nostril, and direct the nozzle towards the opposite side of your face.  This is sometimes easier to be done in the shower over the sink as it can get a little bit messy.  You will know that you have done this correctly if  your eyes slightly water after spraying the solution.  You can do this as prescribed on the box for as long as it takes to treat your symptoms.    Ibuprofen and Tylenol: You can take ibuprofen and Tylenol as prescribed on the box around the clock.  You can either take them on alternating schedules or you can take them together.  These medications work differently in your body, and are safe to be taken together.    Humidifier: Using a humidifier in the area that you sleep or taking a very hot shower to inhale some of the vaporized steam can help to open up your nasal passages and sinuses and encourage them to drain.    Rest: get adequate rest including 7-8 hours of sleep, and low activity levels during the day to encourage healing. Avoid high impact exercise and rigorous physical labor    Nutrition: support healing by fueling your body with healthy foods. Fruits, vegetables, and whole foods are all great options!    Hydration: increase fluid intake. Illness leads to increased dehydration, so your body needs more fluid intake than it might when you are healthy. Chen and sugar free teas are great options. Try to avoid beverages that cause more dehydration including coffee, soda, energy drinks, and alcohol.     As we discussed, if your symptoms do not improve or worsen over the next few days, we may need to revisit the idea of treating your symptoms with an antibiotic.  Please follow-up in the next few days to let me know how you are feeling.     If you are having symptoms of chest pain, shortness of breath, high-grade fever, or wheezing and chest tightness that is not improved by albuterol, please seek immediate medical attention      FIDELIA Nava CNP [facility]   Stephanie is a 33 year old, presenting for the following health issues:  Consult (I was treated for bronchitis then got sick with RSV, developed a cough again that has now lasted over 3 weeks, I've been sick  for a total of 12 weeks with 3-4 different sicknesses.  Patient is taking 5 mg of Adderall. Please review medications. She is down to smoking 2-3 cigarettes a day.)      11/24/2023     3:12 PM   Additional Questions   Roomed by Charmaine Leetlin is a 33-year-old female with a past medical history significant for GERD, PCOS, ADHD, anxiety, and panic disorder.  She presents today with concerns for upper respiratory symptoms mainly concerning a persistent cough.  Patient was first seen on September 24 with complaints of cough and flulike symptoms.  She was treated at this time with azithromycin, albuterol, Tessalon Perles, and a course of prednisone.  Patient then followed up on September 29, as her cough had persisted, now had progressed to intermittent wheezing.  She was evaluated at this time via chest x-ray to rule out concerns for pneumonia.  Checks x-ray was clear at this time, and she was prescribed a extended course of prednisone to treat her cough symptoms.  She reports that after this treatment course she felt completely well for about 3-5 days until she was exposed to a partner with RSV. She followed up on November 8 due to persistent cough, concern for yeast infection, and concern for an ear infection.  She was treated at this time with amoxicillin for a left-sided ear infection, and noted to have RSV.  She was advised at this time to treat her respiratory symptoms with supportive therapy including rest, increase fluid intake, humidification through hot showers, honey to soothe her cough, smoking cessation, and Tylenol and ibuprofen to manage discomfort.  He was also told that she could try over-the-counter cough medications as well as a Mucinex to help to loosen up her cough.  She presents today with symptoms of productive morning-time cough, wheezing, nasal and sinus congestion.  She denies any chest pain, fever, or headache.  Reports that she has been using an albuterol inhaler from a previous  prescription for the past 5 days.  She is using it in the morning and in the evening, and reports that it has not been very helpful.  She was also previously prescribed Tessalon Perles, which she has not taken, as the previous provider told her they can make her drowsy, and she has not been having issues with coughing at night.  She notes that her mucus in the morning is clear to a dull yellow color.  She is still smoking cigarettes, and notes that she began Wellbutrin for smoking cessation support, and since that time has been smoking about 2 to 3 cigarettes a day.  She does attest to having a nasal saline rinse and a saline spray at home, but she has not used either of them consistently.      History of Present Illness       Reason for visit:  Lingering cough    She eats 2-3 servings of fruits and vegetables daily.She consumes 1 sweetened beverage(s) daily.She exercises with enough effort to increase her heart rate 20 to 29 minutes per day.  She exercises with enough effort to increase her heart rate 5 days per week.   She is taking medications regularly.         Review of Systems   Constitutional, HEENT, cardiovascular, pulmonary, gi and gu systems are negative, except as otherwise noted.      Objective    Vitals - Patient Reported  Systolic (Patient Reported):  (not monitored)  Pain Score: No Pain (0)        Physical Exam   GENERAL: Healthy, alert and no distress  EYES: Eyes grossly normal to inspection.  No discharge or erythema, or obvious scleral/conjunctival abnormalities.  RESP: No audible wheeze, cough, or visible cyanosis.  No visible retractions or increased work of breathing.    SKIN: Visible skin clear. No significant rash, abnormal pigmentation or lesions.  NEURO: Cranial nerves grossly intact.  Mentation and speech appropriate for age.  PSYCH: Mentation appears normal, affect normal/bright, judgement and insight intact, normal speech and appearance well-groomed.    Jcalyn Betts, SHELIA FNP-C  Family  Nurse Practitioner - Same Day Provider  [facility] [place]          Video-Visit Details    Type of service:  Video Visit     Originating Location (pt. Location): Home    Distant Location (provider location):  On-site  Platform used for Video Visit: Trey

## 2023-11-24 NOTE — PATIENT INSTRUCTIONS
We will repeat a chest x-ray to rule out pneumonia considering how long you have had this cough and illness symptoms.  If the x-ray shows that there is an infection in your lungs, I will send in antibiotic prescription to your pharmacy.  If this is the case, I would like you to take the entire course of the medication regardless of symptom improvement.  Sometimes these medications can cause stomach upset, so taking it with food can help to prevent this.    If the x-ray shows no infection in your lungs, it is much more likely that your illness is related to a virus. Even though an antibiotic will not help to treat your infection, there are things that we can do to relieve some of your symptoms.    Albuterol: Continue using your inhaler as needed for bouts of cough or shortness of breath.    Tessalon Perles: Try taking half a tablet of Tessalon Perles in the evening to determine how your body will respond to it.  Of course there is a likelihood that you will feel a bit drowsy when taking this medication, and if that is the case reserve this medication for nighttime use only.  If you do not find yourself feeling drowsy, it is safe to take this medication during the day to manage cough    Saline spray: I would like you to  with saline spray over-the-counter.  This works best when you lean slightly forward, insert the spray nozzle into your nostril, and direct the nozzle towards the opposite side of your face.  This is sometimes easier to be done in the shower over the sink as it can get a little bit messy.  You will know that you have done this correctly if your eyes slightly water after spraying the solution.  You can do this as prescribed on the box for as long as it takes to treat your symptoms.    Ibuprofen and Tylenol: You can take ibuprofen and Tylenol as prescribed on the box around the clock.  You can either take them on alternating schedules or you can take them together.  These medications work  differently in your body, and are safe to be taken together.    Humidifier: Using a humidifier in the area that you sleep or taking a very hot shower to inhale some of the vaporized steam can help to open up your nasal passages and sinuses and encourage them to drain.    Rest: get adequate rest including 7-8 hours of sleep, and low activity levels during the day to encourage healing. Avoid high impact exercise and rigorous physical labor    Nutrition: support healing by fueling your body with healthy foods. Fruits, vegetables, and whole foods are all great options!    Hydration: increase fluid intake. Illness leads to increased dehydration, so your body needs more fluid intake than it might when you are healthy. Chen and sugar free teas are great options. Try to avoid beverages that cause more dehydration including coffee, soda, energy drinks, and alcohol.     As we discussed, if your symptoms do not improve or worsen over the next few days, we may need to revisit the idea of treating your symptoms with an antibiotic.  Please follow-up in the next few days to let me know how you are feeling.     If you are having symptoms of chest pain, shortness of breath, high-grade fever, or wheezing and chest tightness that is not improved by albuterol, please seek immediate medical attention

## 2023-12-06 DIAGNOSIS — F41.0 PANIC ATTACK: ICD-10-CM

## 2023-12-06 RX ORDER — PROPRANOLOL HYDROCHLORIDE 10 MG/1
TABLET ORAL
Qty: 10 TABLET | Refills: 1 | Status: SHIPPED | OUTPATIENT
Start: 2023-12-06 | End: 2024-05-24

## 2024-04-04 ENCOUNTER — E-VISIT (OUTPATIENT)
Dept: URGENT CARE | Facility: CLINIC | Age: 34
End: 2024-04-04
Payer: COMMERCIAL

## 2024-04-04 DIAGNOSIS — R21 RASH: Primary | ICD-10-CM

## 2024-04-04 PROCEDURE — 99207 PR NON-BILLABLE SERV PER CHARTING: CPT | Performed by: NURSE PRACTITIONER

## 2024-04-04 NOTE — PATIENT INSTRUCTIONS
Dear Stephanie Arevalo,    We are sorry you are not feeling well. Based on the responses you provided, it is recommended that you be seen in-person in urgent care so we can better evaluate your symptoms. Please click here to find the nearest urgent care location to you.   You will not be charged for this Visit. Thank you for trusting us with your care.    FIDELIA Garcia CNP

## 2024-04-22 ENCOUNTER — VIRTUAL VISIT (OUTPATIENT)
Dept: FAMILY MEDICINE | Facility: CLINIC | Age: 34
End: 2024-04-22
Payer: COMMERCIAL

## 2024-04-22 ENCOUNTER — E-VISIT (OUTPATIENT)
Dept: URGENT CARE | Facility: CLINIC | Age: 34
End: 2024-04-22
Payer: COMMERCIAL

## 2024-04-22 DIAGNOSIS — L98.9 SKIN LESION: Primary | ICD-10-CM

## 2024-04-22 DIAGNOSIS — R21 RASH AND NONSPECIFIC SKIN ERUPTION: Primary | ICD-10-CM

## 2024-04-22 PROCEDURE — 99207 PR NON-BILLABLE SERV PER CHARTING: CPT | Performed by: FAMILY MEDICINE

## 2024-04-22 PROCEDURE — 99213 OFFICE O/P EST LOW 20 MIN: CPT | Mod: 95 | Performed by: NURSE PRACTITIONER

## 2024-04-22 RX ORDER — BUSPIRONE HYDROCHLORIDE 10 MG/1
10 TABLET ORAL 2 TIMES DAILY
COMMUNITY
Start: 2024-02-28

## 2024-04-22 NOTE — PATIENT INSTRUCTIONS
Dear Stephanie Arevalo?     After reviewing your responses, I am unable to make a diagnosis that can be treated online.    You will not be charged for this eVisit.     We are dedicated to helping you achieve your best health and would like to see you in one of our many clinic locations - a primary care provider would be ideal for your concern.    Please use [employer] to schedule a visit with a provider or call [phone] ([phone]) to schedule at any of our locations.    Thanks for choosing?us?as your health care partner,?   ?   Mariana Chaudhary MD?

## 2024-04-22 NOTE — PROGRESS NOTES
"Stephanie is a 33 year old who is being evaluated via a billable video visit.    How would you like to obtain your AVS? MyChart  If the video visit is dropped, the invitation should be resent by: Text to cell phone: [phone]  Will anyone else be joining your video visit? No      Assessment & Plan     Skin lesion    - Adult Dermatology  Referral; Future            Nicotine/Tobacco Cessation  She reports that she has been smoking cigarettes. She started smoking about 15 years ago. She has a 4 pack-year smoking history. She has never used smokeless tobacco.  Nicotine/Tobacco Cessation Plan        BMI  Estimated body mass index is 26.57 kg/m  as calculated from the following:    Height as of 5/5/23: 1.6 m (5' 3\").    Weight as of 11/8/23: 68 kg (150 lb).         See Patient Instructions  Patient Instructions   Reassurance provided, did not see anything worrisome or obvious on video visit.  Encourage good handwashing. Warm packs and monitoring.  Referral placed to follow up with dermatology if it persists or worsens.      Our Clinic hours are:  Mondays    7:20 am - 7 pm  Tues -  Fri  7:20 am - 5 pm    Clinic Phone: [phone]    The clinic lab opens at 7:30 am Mon - Fri and appointments are required.    [facility]. [phone]  Monday  8 am - 7pm  Tues - Fri 8 am - 5:30 pm         Hugh Brown is a 33 year old, presenting for the following health issues:  Eye Problem (Eyelid spot right eye )        4/22/2024     4:33 PM   Additional Questions   Roomed by      HPI       She is worried about molluscum and it being contagious.  Cannot see a spot on video. Outer corner of eye.        Review of Systems  Constitutional, HEENT, cardiovascular, pulmonary, gi and gu systems are negative, except as otherwise noted.      Objective           Vitals:  No vitals were obtained today due to virtual visit.    Physical Exam   GENERAL: alert and no distress  EYES: Eyes grossly normal to " inspection.  No discharge or erythema, or obvious scleral/conjunctival abnormalities.  RESP: No audible wheeze, cough, or visible cyanosis.    SKIN: Visible skin clear. No significant rash, abnormal pigmentation or lesions.  NEURO: Cranial nerves grossly intact.  Mentation and speech appropriate for age.  PSYCH: Appropriate affect, tone, and pace of words          Video-Visit Details    Type of service:  Video Visit   Originating Location (pt. Location): Home    Distant Location (provider location):  On-site  Platform used for Video Visit: Trey  Signed Electronically by: FIDELIA Cornelius CNP

## 2024-04-22 NOTE — PATIENT INSTRUCTIONS
Reassurance provided, did not see anything worrisome or obvious on video visit.  Encourage good handwashing. Warm packs and monitoring.  Referral placed to follow up with dermatology if it persists or worsens.      Our Clinic hours are:  Mondays    7:20 am - 7 pm  Tues -  Fri  7:20 am - 5 pm    Clinic Phone: [phone]    The clinic lab opens at 7:30 am Mon - Fri and appointments are required.    [facility]  Ph. [phone]  Monday  8 am - 7pm  Tues - Fri 8 am - 5:30 pm

## 2024-04-24 ENCOUNTER — OFFICE VISIT (OUTPATIENT)
Dept: FAMILY MEDICINE | Facility: CLINIC | Age: 34
End: 2024-04-24
Payer: COMMERCIAL

## 2024-04-24 VITALS
TEMPERATURE: 98.2 F | DIASTOLIC BLOOD PRESSURE: 89 MMHG | HEART RATE: 82 BPM | SYSTOLIC BLOOD PRESSURE: 139 MMHG | OXYGEN SATURATION: 100 %

## 2024-04-24 DIAGNOSIS — H02.9 EYELID LESION: ICD-10-CM

## 2024-04-24 DIAGNOSIS — B07.9 VIRAL WARTS, UNSPECIFIED TYPE: ICD-10-CM

## 2024-04-24 PROCEDURE — 17110 DESTRUCTION B9 LES UP TO 14: CPT | Performed by: FAMILY MEDICINE

## 2024-04-24 PROCEDURE — 99213 OFFICE O/P EST LOW 20 MIN: CPT | Mod: 25 | Performed by: FAMILY MEDICINE

## 2024-04-24 RX ORDER — BUPROPION HYDROCHLORIDE 150 MG/1
TABLET ORAL DAILY
COMMUNITY
Start: 2024-02-20

## 2024-04-24 RX ORDER — ACETAMINOPHEN AND CODEINE PHOSPHATE 120; 12 MG/5ML; MG/5ML
SOLUTION ORAL
COMMUNITY
Start: 2024-02-01

## 2024-04-24 RX ORDER — DEXTROAMPHETAMINE SACCHARATE, AMPHETAMINE ASPARTATE MONOHYDRATE, DEXTROAMPHETAMINE SULFATE AND AMPHETAMINE SULFATE 2.5; 2.5; 2.5; 2.5 MG/1; MG/1; MG/1; MG/1
CAPSULE, EXTENDED RELEASE ORAL DAILY
COMMUNITY
Start: 2024-03-22

## 2024-04-24 NOTE — PATIENT INSTRUCTIONS
Eyelid lesion - avoid wearing contacts and eye make up until you are seen by eye provider    Finger lesion - On Sunday night please start applying compound W once at night to one to two lesions. Follow up in two to four weeks for repeat cryotherapy.

## 2024-04-24 NOTE — PROGRESS NOTES
"  Assessment & Plan     Eyelid lesion  - lesion not c/w molluscum vs wart  - referred to eye clinic for further evaluation   - Adult Eye  Referral; Future    Viral warts, unspecified type  PLAN: The viral etiology and natural history has been discussed.   Various treatment methods, side effects and failure rates have been   discussed.  A choice of liquid nitrogen was made, and the expected   blistering or scabbing reaction explained.  Liquid nitrogen was   applied to 1 wart(s);  the patient will return at 2-4 week intervals   for retreatments as needed.               BMI  Estimated body mass index is 26.57 kg/m  as calculated from the following:    Height as of 5/5/23: 1.6 m (5' 3\").    Weight as of 11/8/23: 68 kg (150 lb).         Patient Instructions   Eyelid lesion - avoid wearing contacts and eye make up until you are seen by eye provider    Finger lesion - On Sunday night please start applying compound W once at night to one to two lesions. Follow up in two to four weeks for repeat cryotherapy.       Hugh Brown is a 33 year old, presenting for the following health issues:  Eye Problem (Right Eye) and Wart        4/24/2024    11:18 AM   Additional Questions   Roomed by Bambi     History of Present Illness       Reason for visit:  Cyrotherapy and lesion on eye    She eats 2-3 servings of fruits and vegetables daily.She consumes 1 sweetened beverage(s) daily.She exercises with enough effort to increase her heart rate 20 to 29 minutes per day.  She exercises with enough effort to increase her heart rate 4 days per week. She is missing 1 dose(s) of medications per week.     Has preventative visit coming up and will do vaccines at that time    Left hand one wart.     One tiny lesions on the left hand which could be molluscum. Last year she had two ivette scum lesions which were frozen and one lesion which she used duct tape.    The other day she felt bump on the lower eye lid. It is not painful. " Unsure how long it has been there for.               Objective    /89   Pulse 82   Temp 98.2  F (36.8  C) (Oral)   SpO2 100%   There is no height or weight on file to calculate BMI.  Physical Exam               Signed Electronically by: Isai Warner MD

## 2024-05-06 ENCOUNTER — LAB REQUISITION (OUTPATIENT)
Dept: LAB | Facility: CLINIC | Age: 34
End: 2024-05-06
Payer: COMMERCIAL

## 2024-05-06 DIAGNOSIS — L82.0 INFLAMED SEBORRHEIC KERATOSIS: ICD-10-CM

## 2024-05-06 PROCEDURE — 88305 TISSUE EXAM BY PATHOLOGIST: CPT | Mod: 26 | Performed by: PATHOLOGY

## 2024-05-06 PROCEDURE — 88305 TISSUE EXAM BY PATHOLOGIST: CPT | Mod: TC,ORL | Performed by: OPHTHALMOLOGY

## 2024-05-08 LAB
PATH REPORT.COMMENTS IMP SPEC: NORMAL
PATH REPORT.COMMENTS IMP SPEC: NORMAL
PATH REPORT.FINAL DX SPEC: NORMAL
PATH REPORT.GROSS SPEC: NORMAL
PATH REPORT.MICROSCOPIC SPEC OTHER STN: NORMAL
PATH REPORT.RELEVANT HX SPEC: NORMAL
PHOTO IMAGE: NORMAL

## 2024-05-24 DIAGNOSIS — F41.0 PANIC ATTACK: ICD-10-CM

## 2024-05-24 RX ORDER — PROPRANOLOL HYDROCHLORIDE 10 MG/1
TABLET ORAL
Qty: 10 TABLET | Refills: 0 | Status: SHIPPED | OUTPATIENT
Start: 2024-05-24 | End: 2024-07-16

## 2024-06-03 NOTE — PROGRESS NOTES
"  Assessment & Plan     Attention deficit hyperactivity disorder (ADHD), predominantly inattentive type  Feels better off the Adderall.  Feels less agitated and is still able to concentrate with the Wellbutrin    Nicotine dependence in remission, unspecified nicotine product type  Just a little bit less cravings with the Wellbutrin but unfortunately the nicotine inhaler was not covered I did give her the quit plan program to see if other alternate nicotine replacement products with calf  Molluscum contagiosum infection  She has a few discrete molluscum contagiosum lesions in the upper thigh and she has 2 warts on her left hand.  PROCEDURE NOTE:  2 molluscum lesions in the left upper thigh were frozen with liquid nitrogen with  4 cycles of freeze-and-thaw- mupirocin (BACTROBAN) 2 % external ointment; Apply topically 3 times daily  3 cycles of freeze-and-thaw were done for the hand lesions.  She patient tolerated procedure well she was given instructions for post blister management and worked management with salicylic acid and tape.  She was given a prescription of Bactroban in case a secondary infection  MASTER (generalized anxiety disorder)  Better with the Wellbutrin  Will continue at the current dose may consider increasing in 1 month she can message on [employer]  Essential hypertension  Well-controlled on lisinopril.  It was higher with the Adderall has improved.             BMI:   Estimated body mass index is 27.05 kg/m  as calculated from the following:    Height as of this encounter: 1.6 m (5' 3\").    Weight as of this encounter: 69.3 kg (152 lb 11.2 oz).         Can follow-up with routine physical in 6 months meanwhile just continue to send messages about any concern she may have  Marleny Leal MD  [facility]   Stephanie is a 32 year old, presenting for the following health issues:  Recheck Medication and Follow Up (Pt is here for 2-3 week follow up)        5/5/2023    " Anticoagulation Summary  As of 6/3/2024      INR goal:  2.0-3.0   TTR:  65.7% (4.8 y)   INR used for dosin.40 (6/3/2024)   Warfarin maintenance plan:  7.5 mg (5 mg x 1.5) every Tue, Sat; 5 mg (5 mg x 1) all other days   Weekly warfarin total:  40 mg   Plan last modified:  Adam MacedoD (2022)   Next INR check:  2024   Target end date:  Indefinite    Indications    Anticoagulated on warfarin [Z79.01]  Long term (current) use of anticoagulants [Z79.01]  DVT (deep venous thrombosis) (HCC) (Resolved) [I82.409]                 Anticoagulation Episode Summary       INR check location:  Anticoagulation Clinic    Preferred lab:      Send INR reminders to:      Comments:            Anticoagulation Care Providers       Provider Role Specialty Phone number    [facility]   [phone]    Keron Garcia M.D.  Internal Medicine [phone]                  Refer to Patient Findings for HPI:  Patient Findings       Negatives:  Signs/symptoms of thrombosis, Signs/symptoms of bleeding, Laboratory test error suspected, Change in health, Change in alcohol use, Change in activity, Upcoming invasive procedure, Emergency department visit, Upcoming dental procedure, Missed doses, Extra doses, Change in medications, Change in diet/appetite, Hospital admission, Bruising, Other complaints            Vitals:    24 0832   BP: 117/83   Pulse: 80   SpO2: 96%   Weight: 107 kg (235 lb 14.3 oz)       Verified current warfarin dosing schedule.    Medications reconciled: No  Pt is not on antiplatelet therapy.      A/P   INR is therapeutic    Warfarin dosing recommendation: Continue regimen as listed above.    Pt educated to contact our clinic with any changes in medications or s/s of bleeding or thrombosis. Pt is aware to seek immediate medical attention for falls, head injury or deep cuts.    Request pt to return in 12 week(s). Pt agrees.    Adam RiveraD   "12:13 PM   Additional Questions   Roomed by con   Accompanied by alone         5/5/2023    12:13 PM   Patient Reported Additional Medications   Patient reports taking the following new medications none     History of Present Illness       Mental Health Follow-up:  Patient presents to follow-up on Anxiety.    Patient's anxiety since last visit has been:  Better  The patient is not having other symptoms associated with anxiety.  Any significant life events: No  Patient is not feeling anxious or having panic attacks.  Patient has no concerns about alcohol or drug use.    Hypertension: She presents for follow up of hypertension.  She does check blood pressure  regularly outside of the clinic. Outpatient blood pressures have not been over 140/90. She does not follow a low salt diet.     Reason for visit:  Med follow up and molluscum treatment    She eats 2-3 servings of fruits and vegetables daily.She consumes 2 sweetened beverage(s) daily.She exercises with enough effort to increase her heart rate 60 or more minutes per day.  She exercises with enough effort to increase her heart rate 5 days per week.   She is taking medications regularly.  Today's MASTER-7 Score: 4       Pt is here for 2-3 weeks follow up.        Review of Systems         Objective    /88 (BP Location: Left arm, Patient Position: Sitting, Cuff Size: Adult Regular)   Pulse 88   Temp 98.8  F (37.1  C) (Tympanic)   Resp 16   Ht 1.6 m (5' 3\")   Wt 69.3 kg (152 lb 11.2 oz)   LMP 05/01/2022 (Approximate)   SpO2 99%   BMI 27.05 kg/m    Body mass index is 27.05 kg/m .  Physical Exam           2 molluscum contagiosum lesions in the left upper thigh with a tiny papule with central pitting.  Working lesion small plaque-like formation interdigital space between the thumb and the index finger.  Left hand 1 small molluscum versus wart on the dorsum of the left hand            "

## 2024-06-15 ENCOUNTER — HEALTH MAINTENANCE LETTER (OUTPATIENT)
Age: 34
End: 2024-06-15

## 2024-06-18 ENCOUNTER — TELEPHONE (OUTPATIENT)
Dept: INTERNAL MEDICINE | Facility: CLINIC | Age: 34
End: 2024-06-18

## 2024-06-18 DIAGNOSIS — I10 PRIMARY HYPERTENSION: ICD-10-CM

## 2024-06-18 RX ORDER — LISINOPRIL 20 MG/1
20 TABLET ORAL DAILY
Qty: 30 TABLET | Refills: 11 | OUTPATIENT
Start: 2024-06-18

## 2024-07-16 DIAGNOSIS — F41.0 PANIC ATTACK: ICD-10-CM

## 2024-07-16 RX ORDER — PROPRANOLOL HYDROCHLORIDE 10 MG/1
TABLET ORAL
Qty: 10 TABLET | Refills: 0 | Status: SHIPPED | OUTPATIENT
Start: 2024-07-16 | End: 2024-08-28

## 2024-07-29 ENCOUNTER — E-VISIT (OUTPATIENT)
Dept: URGENT CARE | Facility: CLINIC | Age: 34
End: 2024-07-29
Payer: COMMERCIAL

## 2024-07-29 DIAGNOSIS — Z71.1 CONCERN ABOUT SKIN DISEASE WITHOUT DIAGNOSIS: Primary | ICD-10-CM

## 2024-07-29 PROCEDURE — 99207 PR NON-BILLABLE SERV PER CHARTING: CPT | Performed by: FAMILY MEDICINE

## 2024-07-29 NOTE — PATIENT INSTRUCTIONS
Dear Stephanie Arevalo?     After reviewing your responses, I am unable to make a diagnosis that can be treated online.    You will not be charged for this eVisit.     We are dedicated to helping you achieve your best health and would like to see you in one of our many clinic locations - a primary care provider would be ideal for your concern.    Please use [employer] to schedule a visit with a provider or call [phone]) to schedule at any of our locations.    Thanks for choosing?us?as your health care partner,?   ?   FIDELIA ESQUIVEL CNP?

## 2024-08-19 DIAGNOSIS — I10 PRIMARY HYPERTENSION: ICD-10-CM

## 2024-08-19 RX ORDER — LISINOPRIL 20 MG/1
20 TABLET ORAL DAILY
Qty: 90 TABLET | Refills: 11 | Status: SHIPPED | OUTPATIENT
Start: 2024-08-19

## 2024-08-21 DIAGNOSIS — I10 PRIMARY HYPERTENSION: ICD-10-CM

## 2024-08-27 DIAGNOSIS — F41.0 PANIC ATTACK: ICD-10-CM

## 2024-08-28 RX ORDER — PROPRANOLOL HYDROCHLORIDE 10 MG/1
TABLET ORAL
Qty: 10 TABLET | Refills: 0 | Status: SHIPPED | OUTPATIENT
Start: 2024-08-28

## 2024-10-30 NOTE — PATIENT INSTRUCTIONS
[facility] is for any [facility] looking for free support to quit smoking, vaping or chewing.   [facility] will offer many quit support options and resources so [place] residents can continue to find the way to quit that works best for them.   Free support includes personalized coaching, email and text support, educational materials, and quit medication (nicotine patches, gum or lozenges) delivered by mail.     Contact [facility] at 1-800-QUIT-NOW or online at "[employer]" to receive support on your quit journey.    
Detail Level: Zone
Detail Level: Generalized
Detail Level: Detailed

## 2025-06-09 ENCOUNTER — OFFICE VISIT (OUTPATIENT)
Dept: INTERNAL MEDICINE | Facility: CLINIC | Age: 35
End: 2025-06-09
Payer: COMMERCIAL

## 2025-06-09 VITALS
WEIGHT: 157 LBS | HEART RATE: 94 BPM | TEMPERATURE: 98.6 F | BODY MASS INDEX: 27.82 KG/M2 | HEIGHT: 63 IN | RESPIRATION RATE: 16 BRPM | DIASTOLIC BLOOD PRESSURE: 93 MMHG | SYSTOLIC BLOOD PRESSURE: 143 MMHG | OXYGEN SATURATION: 99 %

## 2025-06-09 DIAGNOSIS — Z12.4 CERVICAL CANCER SCREENING: ICD-10-CM

## 2025-06-09 DIAGNOSIS — Z00.00 ROUTINE HISTORY AND PHYSICAL EXAMINATION OF ADULT: ICD-10-CM

## 2025-06-09 DIAGNOSIS — I10 PRIMARY HYPERTENSION: Primary | ICD-10-CM

## 2025-06-09 DIAGNOSIS — Z71.6 ENCOUNTER FOR TOBACCO USE CESSATION COUNSELING: ICD-10-CM

## 2025-06-09 DIAGNOSIS — Z78.9 MODERATE ALCOHOL CONSUMPTION: ICD-10-CM

## 2025-06-09 DIAGNOSIS — F41.0 PANIC ATTACK: ICD-10-CM

## 2025-06-09 DIAGNOSIS — Z11.4 SCREENING FOR HIV (HUMAN IMMUNODEFICIENCY VIRUS): ICD-10-CM

## 2025-06-09 LAB
ALBUMIN SERPL BCG-MCNC: 4.6 G/DL (ref 3.5–5.2)
ALP SERPL-CCNC: 67 U/L (ref 40–150)
ALT SERPL W P-5'-P-CCNC: 57 U/L (ref 0–50)
ANION GAP SERPL CALCULATED.3IONS-SCNC: 12 MMOL/L (ref 7–15)
AST SERPL W P-5'-P-CCNC: 75 U/L (ref 0–45)
BASOPHILS # BLD AUTO: 0 10E3/UL (ref 0–0.2)
BASOPHILS NFR BLD AUTO: 1 %
BILIRUB SERPL-MCNC: 0.4 MG/DL
BUN SERPL-MCNC: 9.3 MG/DL (ref 6–20)
CALCIUM SERPL-MCNC: 9.7 MG/DL (ref 8.8–10.4)
CHLORIDE SERPL-SCNC: 99 MMOL/L (ref 98–107)
CHOLEST SERPL-MCNC: 322 MG/DL
CLUE CELLS: NORMAL
CREAT SERPL-MCNC: 0.68 MG/DL (ref 0.51–0.95)
EGFRCR SERPLBLD CKD-EPI 2021: >90 ML/MIN/1.73M2
EOSINOPHIL # BLD AUTO: 0.1 10E3/UL (ref 0–0.7)
EOSINOPHIL NFR BLD AUTO: 3 %
ERYTHROCYTE [DISTWIDTH] IN BLOOD BY AUTOMATED COUNT: 13.3 % (ref 10–15)
EST. AVERAGE GLUCOSE BLD GHB EST-MCNC: 103 MG/DL
FASTING STATUS PATIENT QL REPORTED: ABNORMAL
FASTING STATUS PATIENT QL REPORTED: ABNORMAL
GLUCOSE SERPL-MCNC: 102 MG/DL (ref 70–99)
HBA1C MFR BLD: 5.2 % (ref 0–5.6)
HBV SURFACE AG SERPL QL IA: NONREACTIVE
HCO3 SERPL-SCNC: 27 MMOL/L (ref 22–29)
HCT VFR BLD AUTO: 44.5 % (ref 35–47)
HCV AB SERPL QL IA: NONREACTIVE
HDLC SERPL-MCNC: 145 MG/DL
HGB BLD-MCNC: 14.7 G/DL (ref 11.7–15.7)
HIV 1+2 AB+HIV1 P24 AG SERPL QL IA: NONREACTIVE
IMM GRANULOCYTES # BLD: 0 10E3/UL
IMM GRANULOCYTES NFR BLD: 0 %
LDLC SERPL CALC-MCNC: 142 MG/DL
LYMPHOCYTES # BLD AUTO: 2.1 10E3/UL (ref 0.8–5.3)
LYMPHOCYTES NFR BLD AUTO: 42 %
MCH RBC QN AUTO: 30.5 PG (ref 26.5–33)
MCHC RBC AUTO-ENTMCNC: 33 G/DL (ref 31.5–36.5)
MCV RBC AUTO: 92 FL (ref 78–100)
MONOCYTES # BLD AUTO: 0.5 10E3/UL (ref 0–1.3)
MONOCYTES NFR BLD AUTO: 11 %
NEUTROPHILS # BLD AUTO: 2.2 10E3/UL (ref 1.6–8.3)
NEUTROPHILS NFR BLD AUTO: 44 %
NONHDLC SERPL-MCNC: 177 MG/DL
PLATELET # BLD AUTO: 269 10E3/UL (ref 150–450)
POTASSIUM SERPL-SCNC: 4.3 MMOL/L (ref 3.4–5.3)
PROT SERPL-MCNC: 7.4 G/DL (ref 6.4–8.3)
RBC # BLD AUTO: 4.82 10E6/UL (ref 3.8–5.2)
SODIUM SERPL-SCNC: 138 MMOL/L (ref 135–145)
TRICHOMONAS, WET PREP: NORMAL
TRIGL SERPL-MCNC: 177 MG/DL
TSH SERPL DL<=0.005 MIU/L-ACNC: 0.53 UIU/ML (ref 0.3–4.2)
VIT D+METAB SERPL-MCNC: 38 NG/ML (ref 20–50)
WBC # BLD AUTO: 4.9 10E3/UL (ref 4–11)
WBC'S/HIGH POWER FIELD, WET PREP: NORMAL
YEAST, WET PREP: NORMAL

## 2025-06-09 PROCEDURE — 87491 CHLMYD TRACH DNA AMP PROBE: CPT | Performed by: INTERNAL MEDICINE

## 2025-06-09 PROCEDURE — 36415 COLL VENOUS BLD VENIPUNCTURE: CPT | Performed by: INTERNAL MEDICINE

## 2025-06-09 PROCEDURE — 87210 SMEAR WET MOUNT SALINE/INK: CPT | Performed by: INTERNAL MEDICINE

## 2025-06-09 PROCEDURE — 80061 LIPID PANEL: CPT | Performed by: INTERNAL MEDICINE

## 2025-06-09 PROCEDURE — 3050F LDL-C >= 130 MG/DL: CPT | Performed by: INTERNAL MEDICINE

## 2025-06-09 PROCEDURE — 99214 OFFICE O/P EST MOD 30 MIN: CPT | Mod: 25 | Performed by: INTERNAL MEDICINE

## 2025-06-09 PROCEDURE — 87340 HEPATITIS B SURFACE AG IA: CPT | Performed by: INTERNAL MEDICINE

## 2025-06-09 PROCEDURE — 83036 HEMOGLOBIN GLYCOSYLATED A1C: CPT | Performed by: INTERNAL MEDICINE

## 2025-06-09 PROCEDURE — 84443 ASSAY THYROID STIM HORMONE: CPT | Performed by: INTERNAL MEDICINE

## 2025-06-09 PROCEDURE — 3080F DIAST BP >= 90 MM HG: CPT | Performed by: INTERNAL MEDICINE

## 2025-06-09 PROCEDURE — 1126F AMNT PAIN NOTED NONE PRSNT: CPT | Performed by: INTERNAL MEDICINE

## 2025-06-09 PROCEDURE — G0145 SCR C/V CYTO,THINLAYER,RESCR: HCPCS | Performed by: INTERNAL MEDICINE

## 2025-06-09 PROCEDURE — 85025 COMPLETE CBC W/AUTO DIFF WBC: CPT | Performed by: INTERNAL MEDICINE

## 2025-06-09 PROCEDURE — 3044F HG A1C LEVEL LT 7.0%: CPT | Performed by: INTERNAL MEDICINE

## 2025-06-09 PROCEDURE — 87624 HPV HI-RISK TYP POOLED RSLT: CPT | Performed by: INTERNAL MEDICINE

## 2025-06-09 PROCEDURE — 99395 PREV VISIT EST AGE 18-39: CPT | Performed by: INTERNAL MEDICINE

## 2025-06-09 PROCEDURE — 3075F SYST BP GE 130 - 139MM HG: CPT | Performed by: INTERNAL MEDICINE

## 2025-06-09 PROCEDURE — 86803 HEPATITIS C AB TEST: CPT | Performed by: INTERNAL MEDICINE

## 2025-06-09 PROCEDURE — 87389 HIV-1 AG W/HIV-1&-2 AB AG IA: CPT | Performed by: INTERNAL MEDICINE

## 2025-06-09 PROCEDURE — 82306 VITAMIN D 25 HYDROXY: CPT | Performed by: INTERNAL MEDICINE

## 2025-06-09 PROCEDURE — 80053 COMPREHEN METABOLIC PANEL: CPT | Performed by: INTERNAL MEDICINE

## 2025-06-09 PROCEDURE — 87591 N.GONORRHOEAE DNA AMP PROB: CPT | Performed by: INTERNAL MEDICINE

## 2025-06-09 RX ORDER — VARENICLINE TARTRATE 0.5 (11)-1
KIT ORAL
Qty: 53 TABLET | Refills: 0 | Status: SHIPPED | OUTPATIENT
Start: 2025-06-09

## 2025-06-09 RX ORDER — FLUCONAZOLE 150 MG/1
150 TABLET ORAL
Qty: 3 TABLET | Refills: 3 | Status: SHIPPED | OUTPATIENT
Start: 2025-06-09 | End: 2025-07-13

## 2025-06-09 RX ORDER — FLUCONAZOLE 150 MG/1
150 TABLET ORAL
COMMUNITY
Start: 2025-06-04

## 2025-06-09 RX ORDER — AMOXICILLIN 500 MG/1
500 CAPSULE ORAL
COMMUNITY
Start: 2025-05-22

## 2025-06-09 RX ORDER — LISINOPRIL 20 MG/1
20 TABLET ORAL DAILY
Qty: 90 TABLET | Refills: 11 | Status: SHIPPED | OUTPATIENT
Start: 2025-06-09

## 2025-06-09 SDOH — HEALTH STABILITY: PHYSICAL HEALTH: ON AVERAGE, HOW MANY DAYS PER WEEK DO YOU ENGAGE IN MODERATE TO STRENUOUS EXERCISE (LIKE A BRISK WALK)?: 5 DAYS

## 2025-06-09 SDOH — HEALTH STABILITY: PHYSICAL HEALTH: ON AVERAGE, HOW MANY MINUTES DO YOU ENGAGE IN EXERCISE AT THIS LEVEL?: 40 MIN

## 2025-06-09 ASSESSMENT — SOCIAL DETERMINANTS OF HEALTH (SDOH): HOW OFTEN DO YOU GET TOGETHER WITH FRIENDS OR RELATIVES?: ONCE A WEEK

## 2025-06-09 ASSESSMENT — PAIN SCALES - GENERAL: PAINLEVEL_OUTOF10: NO PAIN (0)

## 2025-06-09 NOTE — PROGRESS NOTES
Preventive Care Visit  [facility]  Marleny Leal MD, Internal Medicine  Jun 9, 2025      Assessment & Plan     Primary hypertension  Home readings are better . Defer changing meds   She is hoping to taper of adderral but focus should be on smoking cessation first   - metFORMIN (GLUCOPHAGE) 500 MG tablet; Take 1 tablet (500 mg) by mouth daily (with breakfast).  - lisinopril (ZESTRIL) 20 MG tablet; Take 1 tablet (20 mg) by mouth daily.    Screening for HIV (human immunodeficiency virus)      Cervical cancer screening      Routine history and physical examination of adult    - HPV and Gynecologic Cytology Panel - Recommended Age 30 - 65 Years  - Vitamin D Deficiency; Future  - TSH; Future  - Comprehensive metabolic panel; Future  - Hemoglobin A1c; Future  - Lipid panel reflex to direct LDL Fasting; Future  - CBC with Platelets & Differential; Future  - fluconazole (DIFLUCAN) 150 MG tablet; Take 1 tablet (150 mg) by mouth every 3 days for 12 doses.  - NEISSERIA GONORRHOEA PCR; Future  - CHLAMYDIA TRACHOMATIS PCR; Future  - Wet prep - Clinic Collect  - HIV Antigen Antibody Combo; Future  - Hepatitis B surface antigen; Future  - Hepatitis C antibody; Future  - Vitamin D Deficiency  - TSH  - Comprehensive metabolic panel  - Hemoglobin A1c  - Lipid panel reflex to direct LDL Fasting  - CBC with Platelets & Differential  - NEISSERIA GONORRHOEA PCR  - CHLAMYDIA TRACHOMATIS PCR  - HIV Antigen Antibody Combo  - Hepatitis B surface antigen  - Hepatitis C antibody  - Gynecologic Cytology (PAP)    Panic attack  - sertraline (ZOLOFT) 50 MG tablet; Take 1 tablet (50 mg) by mouth daily.    Encounter for tobacco use cessation counseling  Counselled at length trial of chantix , wellbutrin didn't suit her   - [facility] (Tobacco Cessation) Referral; Future  - varenicline (CHANTIX RENEE) 0.5 MG X 11 & 1 MG X 42 tablet; Take 0.5 mg tab daily for 3 days, THEN 0.5 mg tab twice daily for 4 days, THEN 1 mg twice  "daily.    Moderate alcohol consumption  LFTS elevated recommend stopping or cutting back significantly   Hep b and c negative   Recommend US liver in 3 months and repeat LFTS             Nicotine/Tobacco Cessation  She reports that she has been smoking cigarettes. She started smoking about 16 years ago. She has a 4.2 pack-year smoking history. She has never used smokeless tobacco.  Nicotine/Tobacco Cessation Plan        BMI  Estimated body mass index is 27.81 kg/m  as calculated from the following:    Height as of this encounter: 1.6 m (5' 3\").    Weight as of this encounter: 71.2 kg (157 lb).       Counseling  Appropriate preventive services were addressed with this patient via screening, questionnaire, or discussion as appropriate for fall prevention, nutrition, physical activity, Tobacco-use cessation, social engagement, weight loss and cognition.  Checklist reviewing preventive services available has been given to the patient.  Reviewed patient's diet, addressing concerns and/or questions.   She is at risk for psychosocial distress and has been provided with information to reduce risk.   The patient reports drinking more than 3 alcoholic drinks per day and/or more than 7 drhnks per week. The patient was counseled and given information about possible harmful effects of excessive alcohol intake.        Hugh Brown is a 34 year old, presenting for the following:  Physical, Recheck Medication (Pt reports that she's here to complete her physical and would like a pap and STD check.), and STD  Wellbutrin switched to sertraline   Special  , still working   Bp 126/78  Root canal antibiotics , got prescribed diflucan       6/9/2025     9:51 AM   Additional Questions   Roomed by pete   Accompanied by alone          STD               Advance Care Planning    Discussed advance care planning with patient; however, patient declined at this time.        6/9/2025   General Health   How would you rate your " overall physical health? Good   Feel stress (tense, anxious, or unable to sleep) To some extent   (!) STRESS CONCERN      6/9/2025   Nutrition   Three or more servings of calcium each day? Yes   Diet: Vegetarian/vegan   How many servings of fruit and vegetables per day? (!) 2-3   How many sweetened beverages each day? 0-1         6/9/2025   Exercise   Days per week of moderate/strenous exercise 5 days   Average minutes spent exercising at this level 40 min         6/9/2025   Social Factors   Frequency of gathering with friends or relatives Once a week   Worry food won't last until get money to buy more No   Food not last or not have enough money for food? No   Do you have housing? (Housing is defined as stable permanent housing and does not include staying outside in a car, in a tent, in an abandoned building, in an overnight shelter, or couch-surfing.) Yes   Are you worried about losing your housing? No   Lack of transportation? No   Unable to get utilities (heat,electricity)? No         6/9/2025   Dental   Dentist two times every year? Yes         Today's PHQ-2 Score:       6/9/2025     9:51 AM   PHQ-2 ( 1999 [employer])   Q1: Little interest or pleasure in doing things 1   Q2: Feeling down, depressed or hopeless 0   PHQ-2 Score 1    Q1: Little interest or pleasure in doing things Several days   Q2: Feeling down, depressed or hopeless Not at all   PHQ-2 Score 1       Patient-reported           6/9/2025   Substance Use   Alcohol more than 3/day or more than 7/wk Yes   How often do you have a drink containing alcohol 4 or more times a week   How many alcohol drinks on typical day 3 or 4   How often do you have 5+ drinks at one occasion Weekly   Audit 2/3 Score 4   How often not able to stop drinking once started Less than monthly   How often failed to do what normally expected Monthly   How often needed first drink in am after a heavy drinking session Less than monthly   How often feeling of guilt or remorse after  "drinking Less than monthly   How often unable to remember what happened the night before Less than monthly   Have you or someone else been injured because of your drinking No   Has anyone been concerned or suggested you cut down on drinking Yes, but not in the last year   TOTAL SCORE - AUDIT 16   Do you use any other substances recreationally? No     Social History     Tobacco Use    Smoking status: Every Day     Current packs/day: 0.25     Average packs/day: 0.3 packs/day for 16.9 years (4.2 ttl pk-yrs)     Types: Cigarettes     Start date: 7/1/2008    Smokeless tobacco: Never    Tobacco comments:     She is smoking 1-2 cigarettes a day.   Vaping Use    Vaping status: Some Days    Substances: Nicotine    Devices: Disposable, Pre-filled or refillable cartridge                    6/9/2025   One time HIV Screening   Previous HIV test? Yes         6/9/2025   STI Screening   New sexual partner(s) since last STI/HIV test? (!) YES      History of abnormal Pap smear:              6/9/2025   Contraception/Family Planning   Questions about contraception or family planning (!) YES         Reviewed and updated as needed this visit by Provider                             Objective    Exam  BP (!) 143/93 (BP Location: Left arm, Patient Position: Sitting, Cuff Size: Adult Regular)   Pulse 94   Temp 98.6  F (37  C) (Tympanic)   Resp 16   Ht 1.6 m (5' 3\")   Wt 71.2 kg (157 lb)   LMP  (LMP Unknown)   SpO2 99%   Breastfeeding No   BMI 27.81 kg/m     Estimated body mass index is 27.81 kg/m  as calculated from the following:    Height as of this encounter: 1.6 m (5' 3\").    Weight as of this encounter: 71.2 kg (157 lb).    Physical Exam  GENERAL: alert and no distress  NECK: no adenopathy, no asymmetry, masses, or scars  RESP: lungs clear to auscultation - no rales, rhonchi or wheezes  CV: regular rate and rhythm, normal S1 S2, no S3 or S4, no murmur, click or rub, no peripheral edema  ABDOMEN: soft, nontender, no " hepatosplenomegaly, no masses and bowel sounds normal   (female) w/bimanual: normal female external genitalia, normal urethral meatus, normal vaginal mucosa, normal cervix/adnexa/uterus without masses or discharge  MS: no gross musculoskeletal defects noted, no edema          Signed Electronically by: Marleny Leal MD

## 2025-06-10 ENCOUNTER — RESULTS FOLLOW-UP (OUTPATIENT)
Dept: FAMILY MEDICINE | Facility: CLINIC | Age: 35
End: 2025-06-10
Payer: COMMERCIAL

## 2025-06-10 DIAGNOSIS — K70.9 LIVER DISEASE, CHRONIC, DUE TO ALCOHOL: Primary | ICD-10-CM

## 2025-06-10 LAB
C TRACH DNA SPEC QL NAA+PROBE: NEGATIVE
HPV HR 12 DNA CVX QL NAA+PROBE: NEGATIVE
HPV16 DNA CVX QL NAA+PROBE: NEGATIVE
HPV18 DNA CVX QL NAA+PROBE: NEGATIVE
HUMAN PAPILLOMA VIRUS FINAL DIAGNOSIS: NORMAL
N GONORRHOEA DNA SPEC QL NAA+PROBE: NEGATIVE
SPECIMEN TYPE: NORMAL
SPECIMEN TYPE: NORMAL

## 2025-06-12 LAB
BKR AP ASSOCIATED HPV REPORT: NORMAL
BKR LAB AP GYN ADEQUACY: NORMAL
BKR LAB AP GYN INTERPRETATION: NORMAL
BKR LAB AP PREVIOUS ABNORMAL: NORMAL
PATH REPORT.COMMENTS IMP SPEC: NORMAL
PATH REPORT.COMMENTS IMP SPEC: NORMAL
PATH REPORT.RELEVANT HX SPEC: NORMAL